# Patient Record
Sex: FEMALE | Race: WHITE
[De-identification: names, ages, dates, MRNs, and addresses within clinical notes are randomized per-mention and may not be internally consistent; named-entity substitution may affect disease eponyms.]

---

## 2019-08-03 ENCOUNTER — HOSPITAL ENCOUNTER (EMERGENCY)
Dept: HOSPITAL 10 - E/R | Age: 44
LOS: 1 days | Discharge: HOME | End: 2019-08-04
Payer: COMMERCIAL

## 2019-08-03 ENCOUNTER — HOSPITAL ENCOUNTER (EMERGENCY)
Dept: HOSPITAL 91 - E/R | Age: 44
LOS: 1 days | Discharge: HOME | End: 2019-08-04
Payer: COMMERCIAL

## 2019-08-03 VITALS
WEIGHT: 205.47 LBS | HEIGHT: 63 IN | BODY MASS INDEX: 36.41 KG/M2 | BODY MASS INDEX: 36.41 KG/M2 | WEIGHT: 205.47 LBS | HEIGHT: 63 IN

## 2019-08-03 DIAGNOSIS — N30.00: Primary | ICD-10-CM

## 2019-08-03 LAB
ADD MAN DIFF?: NO
ADD UMIC: YES
ALANINE AMINOTRANSFERASE: 53 IU/L (ref 13–69)
ALBUMIN/GLOBULIN RATIO: 1.13
ALBUMIN: 4.3 G/DL (ref 3.3–4.9)
ALKALINE PHOSPHATASE: 95 IU/L (ref 42–121)
ANION GAP: 9 (ref 5–13)
ASPARTATE AMINO TRANSFERASE: 38 IU/L (ref 15–46)
BASOPHIL #: 0 10^3/UL (ref 0–0.1)
BASOPHILS %: 0.4 % (ref 0–2)
BILIRUBIN,DIRECT: 0 MG/DL (ref 0–0.2)
BILIRUBIN,TOTAL: 0.4 MG/DL (ref 0.2–1.3)
BLOOD UREA NITROGEN: 22 MG/DL (ref 7–20)
CALCIUM: 9.9 MG/DL (ref 8.4–10.2)
CARBON DIOXIDE: 27 MMOL/L (ref 21–31)
CHLORIDE: 105 MMOL/L (ref 97–110)
CREATININE: 0.88 MG/DL (ref 0.44–1)
EOSINOPHILS #: 0.2 10^3/UL (ref 0–0.5)
EOSINOPHILS %: 2 % (ref 0–7)
GLOBULIN: 3.8 G/DL (ref 1.3–3.2)
GLUCOSE: 101 MG/DL (ref 70–220)
HEMATOCRIT: 41 % (ref 37–47)
HEMOGLOBIN: 13.3 G/DL (ref 12–16)
IMMATURE GRANS #M: 0.02 10^3/UL (ref 0–0.03)
IMMATURE GRANS % (M): 0.2 % (ref 0–0.43)
LIPASE: 105 U/L (ref 23–300)
LYMPHOCYTES #: 3.2 10^3/UL (ref 0.8–2.9)
LYMPHOCYTES %: 29.3 % (ref 15–51)
MEAN CORPUSCULAR HEMOGLOBIN: 30.6 PG (ref 29–33)
MEAN CORPUSCULAR HGB CONC: 32.4 G/DL (ref 32–37)
MEAN CORPUSCULAR VOLUME: 94.3 FL (ref 82–101)
MEAN PLATELET VOLUME: 11.8 FL (ref 7.4–10.4)
MONOCYTE #: 0.8 10^3/UL (ref 0.3–0.9)
MONOCYTES %: 7.4 % (ref 0–11)
NEUTROPHIL #: 6.5 10^3/UL (ref 1.6–7.5)
NEUTROPHILS %: 60.7 % (ref 39–77)
NUCLEATED RED BLOOD CELLS #: 0 10^3/UL (ref 0–0)
NUCLEATED RED BLOOD CELLS%: 0 /100WBC (ref 0–0)
PLATELET COUNT: 253 10^3/UL (ref 140–415)
POTASSIUM: 3.7 MMOL/L (ref 3.5–5.1)
RED BLOOD COUNT: 4.35 10^6/UL (ref 4.2–5.4)
RED CELL DISTRIBUTION WIDTH: 12.4 % (ref 11.5–14.5)
SODIUM: 141 MMOL/L (ref 135–144)
TOTAL PROTEIN: 8.1 G/DL (ref 6.1–8.1)
UR ASCORBIC ACID: NEGATIVE MG/DL
UR BILIRUBIN (DIP): NEGATIVE MG/DL
UR BLOOD (DIP): (no result) MG/DL
UR CLARITY: (no result)
UR COLOR: YELLOW
UR GLUCOSE (DIP): NEGATIVE MG/DL
UR KETONES (DIP): NEGATIVE MG/DL
UR LEUKOCYTE ESTERASE (DIP): (no result) LEU/UL
UR MUCUS: (no result) /HPF
UR NITRITE (DIP): NEGATIVE MG/DL
UR PH (DIP): 5 (ref 5–9)
UR RBC: 4 /HPF (ref 0–5)
UR SPECIFIC GRAVITY (DIP): 1.03 (ref 1–1.03)
UR SQUAMOUS EPITHELIAL CELL: (no result) /HPF
UR TOTAL PROTEIN (DIP): NEGATIVE MG/DL
UR UROBILINOGEN (DIP): NEGATIVE MG/DL
UR WBC: 9 /HPF (ref 0–5)
URINE BLOOD (DIP) POC: (no result)
URINE LEUKOCYTE EST (DIP) POC: (no result)
URINE PH (DIP) POC: 6 (ref 5–8.5)
WHITE BLOOD COUNT: 10.7 10^3/UL (ref 4.8–10.8)

## 2019-08-03 PROCEDURE — 87086 URINE CULTURE/COLONY COUNT: CPT

## 2019-08-03 PROCEDURE — 81003 URINALYSIS AUTO W/O SCOPE: CPT

## 2019-08-03 PROCEDURE — 81001 URINALYSIS AUTO W/SCOPE: CPT

## 2019-08-03 PROCEDURE — 96374 THER/PROPH/DIAG INJ IV PUSH: CPT

## 2019-08-03 PROCEDURE — 96361 HYDRATE IV INFUSION ADD-ON: CPT

## 2019-08-03 PROCEDURE — 74176 CT ABD & PELVIS W/O CONTRAST: CPT

## 2019-08-03 PROCEDURE — 81025 URINE PREGNANCY TEST: CPT

## 2019-08-03 PROCEDURE — 83690 ASSAY OF LIPASE: CPT

## 2019-08-03 PROCEDURE — 80053 COMPREHEN METABOLIC PANEL: CPT

## 2019-08-03 PROCEDURE — 85025 COMPLETE CBC W/AUTO DIFF WBC: CPT

## 2019-08-03 PROCEDURE — 36415 COLL VENOUS BLD VENIPUNCTURE: CPT

## 2019-08-03 PROCEDURE — 96375 TX/PRO/DX INJ NEW DRUG ADDON: CPT

## 2019-08-03 PROCEDURE — 99285 EMERGENCY DEPT VISIT HI MDM: CPT

## 2019-08-03 RX ADMIN — ONDANSETRON HYDROCHLORIDE 1 MG: 2 INJECTION, SOLUTION INTRAMUSCULAR; INTRAVENOUS at 22:10

## 2019-08-03 RX ADMIN — THIAMINE HYDROCHLORIDE 1 MLS/HR: 100 INJECTION, SOLUTION INTRAMUSCULAR; INTRAVENOUS at 22:10

## 2019-08-03 NOTE — ERD
ER Documentation


Chief Complaint


Chief Complaint





BILATERAL FLANK PAIN X 2 DAYS.





HPI


43-year-old female with a history of unspecified pancreas surgery and 


cholecystectomy in 2016 and since then she has been having intermittent episodes


of severe, sharp and achy, left flank pain recurred 2 days ago.  Symptoms are 


noted by nausea with multiple episodes of nonbloody nonbilious emesis but no 


diarrhea or constipation.  No hematemesis, hematochezia or melanotic stools.  


Recently completed a course of antibiotics for urinary tract infection but 


continues to complain of mild dysuria but no polyuria.  Denies chest pain or 


palpitations.  No fevers or chills.





ROS


All systems reviewed and are negative except as per history of present illness.





Allergies


Allergies:  


Coded Allergies:  


     No Known Allergy (Unverified , 8/3/19)





PMhx/Soc


History of Surgery:  Yes ("Pancreas surgery".  Cholecystectomy)


Anesthesia Reaction:  No


Hx Neurological Disorder:  No


Hx Respiratory Disorders:  No


Hx Cardiac Disorders:  No


Hx Psychiatric Problems:  No


Hx Miscellaneous Medical Probl:  Yes


Hx Alcohol Use:  No


Hx Substance Use:  No


Hx Tobacco Use:  No





FmHx


No stroke or cancer





Physical Exam


Vitals





Vital Signs


  Date      Temp  Pulse  Resp  B/P (MAP)   Pulse Ox  O2          O2 Flow    FiO2


Time                                                 Delivery    Rate


    8/3/19  97.3     85    22      148/93        98


     21:13                          (111)





Physical Exam


Const:   Moderate distress due to pain.


Head:   Atraumatic 


Eyes:    Normal Conjunctiva


ENT:    Normal External Ears, Nose and Mouth.


Neck:               Full range of motion. No meningismus.


Resp:   Breath sounds are equal and clear to auscultation bilaterally


Cardio:   Regular rate and rhythm, no murmurs


Abd:    Soft, non tender, obese, non distended. Normal bowel sounds


Skin:   No petechiae or rashes


Back:   No midline or flank tenderness


Ext:    No cyanosis, or edema


Neur:   Awake and alert


Psych:    Anxious but not depressed.


Result Diagram:  


8/3/19 2205                                                                     


          8/3/19 2205





Results 24 hrs





Laboratory Tests


Test
                   8/3/19
21:41  8/3/19
21:51  8/3/19
21:53    8/3/19
22:05


Urine Color          YELLOW


Urine Clarity
       SLIGHTLY
CLOUDY  
             
             



Urine pH                        5.0


Urine Specific                1.030


Gravity


Urine Ketones        NEGATIVE mg/dL


Urine Nitrite        NEGATIVE mg/dL


Urine Bilirubin      NEGATIVE mg/dL


Urine Urobilinogen   NEGATIVE mg/dL


Urine Leukocyte           3+ Claudia/ul


Esterase


Urine Microscopic            4 /HPF


RBC


Urine Microscopic            9 /HPF


WBC


Urine Squamous       FEW /HPF 
       
             
             



Epithelial
Cells


Urine Mucus          FEW /HPF


Urine Hemoglobin           2+ mg/dL


Urine Glucose        NEGATIVE mg/dL


Urine Total Protein  NEGATIVE mg/dl


Bedside Urine pH                              6.0


(LAB)


Bedside Urine                         Negative


Protein (LAB)


Bedside Urine                         Negative


Glucose (UA)


Bedside Urine                         Trace


Ketones (LAB)


Bedside Urine Blood                            2+


Bedside Urine                         Negative


Nitrite (LAB)


Bedside Urine        
                        1+ 
  
             



Leukocyte
Esterase


(L


POC Beta HCG,                                       NEGATIVE


Qualitative


White Blood Count                                                  10.7 10^3/ul


Red Blood Count                                                    4.35 10^6/ul


Hemoglobin                                                            13.3 g/dl


Hematocrit                                                               41.0 %


Mean Corpuscular                                                        94.3 fl


Volume


Mean Corpuscular                                                        30.6 pg


Hemoglobin


Mean Corpuscular     
                
             
                32.4 g/dl 



Hemoglobin
Concent


Red Cell                                                                 12.4 %


Distribution Width


Platelet Count                                                      253 10^3/UL


Mean Platelet                                                           11.8 fl


Volume


Immature                                                                0.200 %


Granulocytes %


Neutrophils %                                                            60.7 %


Lymphocytes %                                                            29.3 %


Monocytes %                                                               7.4 %


Eosinophils %                                                             2.0 %


Basophils %                                                               0.4 %


Nucleated Red Blood                                                 0.0 /100WBC


Cells %


Immature                                                          0.020 10^3/ul


Granulocytes #


Neutrophils #                                                       6.5 10^3/ul


Lymphocytes #                                                       3.2 10^3/ul


Monocytes #                                                         0.8 10^3/ul


Eosinophils #                                                       0.2 10^3/ul


Basophils #                                                         0.0 10^3/ul


Nucleated Red Blood                                                 0.0 10^3/ul


Cells #


Sodium Level                                                         141 mmol/L


Potassium Level                                                      3.7 mmol/L


Chloride Level                                                       105 mmol/L


Carbon Dioxide                                                        27 mmol/L


Level


Anion Gap                                                                     9


Blood Urea Nitrogen                                                    22 mg/dl


Creatinine                                                           0.88 mg/dl


Est Glomerular       
                
             
             > 60 mL/min 



Filtrat Rate
mL/min


Glucose Level                                                         101 mg/dl


Calcium Level                                                         9.9 mg/dl


Total Bilirubin                                                       0.4 mg/dl


Direct Bilirubin                                                     0.00 mg/dl


Indirect Bilirubin                                                    0.4 mg/dl


Aspartate Amino      
                
             
                  38 IU/L 



Transf
(AST/SGOT)


Alanine              
                
             
                  53 IU/L 



Aminotransferase
(A


LT/SGPT)


Alkaline                                                                95 IU/L


Phosphatase


Total Protein                                                          8.1 g/dl


Albumin                                                                4.3 g/dl


Globulin                                                              3.80 g/dl


Albumin/Globulin                                                           1.13


Ratio


Lipase                                                                  105 U/L





Current Medications


 Medications
   Dose
          Sig/Michael
       Start Time
   Status  Last


 (Trade)       Ordered        Route
 PRN     Stop Time              Admin
Dose


                              Reason                                Admin


 Sodium         1,000 ml @ 
   Q1H STAT
      8/3/19        DC            8/3/19


Chloride       1,000 mls/hr   IV
            21:55
 8/3/19                22:10



                                             22:54


 Morphine       4 mg           ONCE  STAT
    8/3/19        DC            8/3/19


Sulfate
                      IV
            21:55
 8/3/19                22:10



(morphine)                                   21:58


 Ondansetron    4 mg           ONCE  STAT
    8/3/19        DC            8/3/19


HCl
  (Zofran                 IV
            21:55
 8/3/19                22:10



Inj)                                         21:58








Procedures/MDM


DOCUMENTS REVIEWED:   ED nurse, no prior records





IMAGING: 





PROCEDURE:   CT ABDOMEN AND PELVIS WITHOUT CONTRAST. 


 


CLINICAL INDICATION:   Left flank pain and painful urination


 


TECHNIQUE:   CT scan of the abdomen and pelvis without contrast was performed on


a multidetector high-resolution CT scanner. The patient was scanned without 


intravenous contrast.  Coronal and sagittal reformatted images were obtained 


from the axial source images. Images were reviewed on a high-resolution PACS 


workstation. The total exam CTDI equals 22.5 mGy and the total exam DLP equals 


1330.3 mGy-cm.


 


One or more of the following dose reduction techniques were used:


Automated exposure control.


Adjustment of the mA and/or kV according to patient size.


Use of iterative reconstruction technique.


 


DICOM images are available


 


COMPARISON:   None 


 


FINDINGS:


 


CT abdomen:


 


The lung bases are clear. The heart size is within normal limits. There is no 


significant pericardial effusion.


 


Hepatic morphology is within normal limits. There is diffuse fatty infiltration 


of the liver. No gross contour deforming masses. The gallbladder is not 


visualized. Status post cholecystectomy. No evidence of intrahepatic or 


extrahepatic biliary dilatation.


 


 


The spleen and pancreas are within normal limits.


 


Both adrenal glands are within normal limits.


 


Both kidneys are in normal anatomic position. No evidence of obstruction or 


hydronephrosis. No gross renal/ureteric calculi.


 


The visualized GI tract demonstrate normal caliber loops of small and large 


bowel. No evidence of bowel obstruction. The appendix is within normal limits.


 


The unenhanced aorta is unremarkable. No significant retroperitoneal lymphadeno


tara.


 


 


CT pelvis:


 


The bladder is contracted.. Uterus is within normal limits. Rectosigmoid colon 


demonstrates stool and diverticulosis. No significant free fluid. No significant


pelvic lymphadenopathy.


 


The visualized osseous structures, appears to be within normal limits.


 


IMPRESSION:


 


1. No gross renal/ureteric calculi. No evidence of obstructive uropathy. 


Contracted bladder.


 


2. Fatty liver. Status post cholecystectomy.


 


3. No evidence of acute intra-abdominal/pelvic inflammatory process. No evidence


of bowel obstruction. The appendix is within normal limits.


 


4. Fat-containing umbilical hernia. Otherwise unremarkable unenhanced CT scan of


abdomen/pelvis.


 


RPTAT: AAPP


_____________________________________________ 


Physician Hitesh           Date    Time 


Electronically viewed and signed by Physician Hitesh on 2019 22:35 











REEXAMINATION/REEVALUATION: Time: 23:45.  Doing well.  Pain resolved.  No 


further nausea or vomiting.








MEDICAL DECISION MAKIN-year-old female with a history of unspecified 


pancreas surgery and cholecystectomy in 2016 and since then she has been having 


intermittent episodes of severe, sharp and achy, left flank pain recurred 2 days


ago.  CBC to evaluate for leukocytosis, anemia and thrombus cytopenia is unrem


arkable.  Chemistry reveals borderline elevated BUN with normal creatinine 


consistent with prerenal azotemia and dehydration but no electrolyte 


abnormalities or hyperglycemia.  CT of the abdomen and pelvis reveals findings 


consistent with prior cholecystectomy but no evidence of obstructive uropathy, 


abdominal aortic aneurysm, colitis, diverticulitis or appendicitis.  Pain 


resolved with intravenous hydration, analgesics and antiemetics.  Urinalysis is 


questionable for infection especially as the patient just completed a course of 


antibiotics but if she is asymptomatic Macrobid will be initiated pending 


cultures.  No CVA tenderness or signs of pyelonephritis.  Patient presents with 


acute exacerbation of chronic flank pain of uncertain etiology.  The absence of 


signs of serious, acute disease she is stable for discharge with precautionary 


instructions and outpatient follow-up as counseled.








Counseled patient regarding diagnostic workup, diagnosis and need for followup. 


Understands to return to ED if symptoms recur, worsen or any other concerns.





Departure


Diagnosis:  


   Primary Impression:  


   Acute left flank pain


   Additional Impression:  


   UTI (urinary tract infection)


   Urinary tract infection type:  acute cystitis  Hematuria presence:  without 


   hematuria  Qualified Codes:  N30.00 - Acute cystitis without hematuria


Condition:  Stable











ROXANE VELARDE MD            Aug 3, 2019 21:45

## 2019-08-04 VITALS — HEART RATE: 71 BPM | DIASTOLIC BLOOD PRESSURE: 74 MMHG | SYSTOLIC BLOOD PRESSURE: 118 MMHG | RESPIRATION RATE: 17 BRPM
